# Patient Record
Sex: FEMALE | Race: WHITE | NOT HISPANIC OR LATINO | Employment: UNEMPLOYED | URBAN - METROPOLITAN AREA
[De-identification: names, ages, dates, MRNs, and addresses within clinical notes are randomized per-mention and may not be internally consistent; named-entity substitution may affect disease eponyms.]

---

## 2017-10-13 ENCOUNTER — TELEPHONE (OUTPATIENT)
Dept: OBSTETRICS AND GYNECOLOGY | Facility: CLINIC | Age: 37
End: 2017-10-13

## 2017-10-13 RX ORDER — FLUCONAZOLE 150 MG/1
TABLET ORAL
Qty: 1 TABLET | Refills: 0 | Status: SHIPPED | OUTPATIENT
Start: 2017-10-13

## 2017-10-13 NOTE — TELEPHONE ENCOUNTER
I returned the patient's call.  No answer, but left a message on voice mail asking her to return my call today.  Per Dr. Damon, I can give her one dose of Diflucan if she desires.  Will wait to talk with her and make sure we have the correct pharmacy.

## 2017-10-13 NOTE — TELEPHONE ENCOUNTER
Patient has been on antibiotics for abscessed tooth.  Has a yeast infection.  Has used OTC Monistat, but still symptomatic.  Per Dr. Damon, I can send one dose of diflucan to her pharmacy.  If symptoms persist, she will need to make an appointment with Dr. Damon.  Also wondered if it was OK to use Monistat after having a hysterectomy?  I assured her that it was perfectly safe to use vaginal creams post hysterectomy.

## 2017-10-13 NOTE — TELEPHONE ENCOUNTER
Pt of Dr Damon is calling to state that she has a yeast infection and has some questions to ask since she has had a complete hysterectomy.

## 2018-08-14 ENCOUNTER — TELEPHONE (OUTPATIENT)
Dept: OBSTETRICS AND GYNECOLOGY | Facility: CLINIC | Age: 38
End: 2018-08-14

## 2018-08-14 NOTE — TELEPHONE ENCOUNTER
Dr Damon Pt    Pt called and wanted to ask Genet Lassiter a question prior to her appointment on Thursday.  Pt was told that another medical assistant could call her back as Genet and Dr. Damon were out.  Pt then said she would just wait until Thursday.

## 2018-08-16 ENCOUNTER — APPOINTMENT (OUTPATIENT)
Dept: LAB | Facility: HOSPITAL | Age: 38
End: 2018-08-16

## 2018-08-16 ENCOUNTER — OFFICE VISIT (OUTPATIENT)
Dept: OBSTETRICS AND GYNECOLOGY | Facility: CLINIC | Age: 38
End: 2018-08-16

## 2018-08-16 VITALS
BODY MASS INDEX: 28.67 KG/M2 | RESPIRATION RATE: 14 BRPM | DIASTOLIC BLOOD PRESSURE: 78 MMHG | SYSTOLIC BLOOD PRESSURE: 112 MMHG | HEIGHT: 62 IN | WEIGHT: 155.8 LBS

## 2018-08-16 DIAGNOSIS — Z20.2 POTENTIAL EXPOSURE TO STD: Primary | ICD-10-CM

## 2018-08-16 DIAGNOSIS — N95.1 MENOPAUSAL SYMPTOMS: ICD-10-CM

## 2018-08-16 DIAGNOSIS — N80.9 ENDOMETRIOSIS: ICD-10-CM

## 2018-08-16 PROBLEM — Z98.890 S/P LAPAROSCOPY: Status: ACTIVE | Noted: 2018-08-16

## 2018-08-16 PROBLEM — Z90.710 HISTORY OF ABDOMINAL HYSTERECTOMY: Status: ACTIVE | Noted: 2018-08-16

## 2018-08-16 LAB
FSH SERPL-ACNC: 2.6 MIU/ML
HIV1+2 AB SER QL: NORMAL
TSH SERPL DL<=0.05 MIU/L-ACNC: 0.42 MIU/ML (ref 0.35–5.35)

## 2018-08-16 PROCEDURE — 99213 OFFICE O/P EST LOW 20 MIN: CPT | Performed by: OBSTETRICS & GYNECOLOGY

## 2018-08-16 PROCEDURE — 84443 ASSAY THYROID STIM HORMONE: CPT | Performed by: OBSTETRICS & GYNECOLOGY

## 2018-08-16 PROCEDURE — 86592 SYPHILIS TEST NON-TREP QUAL: CPT | Performed by: OBSTETRICS & GYNECOLOGY

## 2018-08-16 PROCEDURE — G0432 EIA HIV-1/HIV-2 SCREEN: HCPCS | Performed by: OBSTETRICS & GYNECOLOGY

## 2018-08-16 PROCEDURE — 36415 COLL VENOUS BLD VENIPUNCTURE: CPT | Performed by: OBSTETRICS & GYNECOLOGY

## 2018-08-16 PROCEDURE — 83001 ASSAY OF GONADOTROPIN (FSH): CPT | Performed by: OBSTETRICS & GYNECOLOGY

## 2018-08-16 RX ORDER — RANITIDINE 150 MG/1
TABLET ORAL
COMMUNITY

## 2018-08-16 RX ORDER — OMEPRAZOLE 20 MG/1
CAPSULE, DELAYED RELEASE ORAL
COMMUNITY
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
## 2018-08-17 LAB — RPR SER QL: NORMAL

## 2018-08-30 ENCOUNTER — TELEPHONE (OUTPATIENT)
Dept: OBSTETRICS AND GYNECOLOGY | Facility: CLINIC | Age: 38
End: 2018-08-30

## 2021-05-06 ENCOUNTER — TELEPHONE (OUTPATIENT)
Dept: OBSTETRICS AND GYNECOLOGY | Facility: CLINIC | Age: 41
End: 2021-05-06

## 2021-05-06 NOTE — TELEPHONE ENCOUNTER
PT IS CALLING ABOUT HER DAUGHTER WALDEMAR WHO HAS NEVER SEEN DR ROMAN AND JUST HAS A QUESTION ABOUT TEENAGE CYCLES- PLEASE CALL [phone]